# Patient Record
Sex: MALE | Race: WHITE | NOT HISPANIC OR LATINO | URBAN - METROPOLITAN AREA
[De-identification: names, ages, dates, MRNs, and addresses within clinical notes are randomized per-mention and may not be internally consistent; named-entity substitution may affect disease eponyms.]

---

## 2019-11-30 ENCOUNTER — EMERGENCY (EMERGENCY)
Facility: HOSPITAL | Age: 34
LOS: 1 days | Discharge: HOME | End: 2019-11-30
Admitting: EMERGENCY MEDICINE
Payer: COMMERCIAL

## 2019-11-30 VITALS
TEMPERATURE: 98 F | OXYGEN SATURATION: 98 % | HEIGHT: 70 IN | DIASTOLIC BLOOD PRESSURE: 89 MMHG | WEIGHT: 180.78 LBS | HEART RATE: 72 BPM | RESPIRATION RATE: 17 BRPM | SYSTOLIC BLOOD PRESSURE: 156 MMHG

## 2019-11-30 PROCEDURE — 73610 X-RAY EXAM OF ANKLE: CPT | Mod: 26,LT

## 2019-11-30 PROCEDURE — 99284 EMERGENCY DEPT VISIT MOD MDM: CPT | Mod: 25

## 2019-11-30 PROCEDURE — 27824 TREAT LOWER LEG FRACTURE: CPT

## 2019-11-30 NOTE — ED PROVIDER NOTE - OBJECTIVE STATEMENT
s/p twist and fall after tripping over a pothole as he crossed the street at 7p yesturday. states swelling over ankle started immediately. states that he had twisted the same ankle 10 days ago. had some swelling that had been slowly improving. able to weigh bear. denies focal weakness or paresthesias. denies history of fx over ankle. has hx of ligamentous injury over L knee.

## 2019-11-30 NOTE — ED PROVIDER NOTE - CARE PROVIDER_API CALL
Herminio Parks)  Orthopedics  [address] [address]  Phone: [phone]  Fax: [phone]  Follow Up Time:

## 2019-11-30 NOTE — ED PROVIDER NOTE - PATIENT PORTAL LINK FT
You can access the FollowMyHealth Patient Portal offered by [facility] by registering at the following website: [email or web address]. By joining [employer]’s FollowMyHealth portal, you will also be able to view your health information using other applications (apps) compatible with our system.

## 2019-11-30 NOTE — ED PROVIDER NOTE - CLINICAL SUMMARY MEDICAL DECISION MAKING FREE TEXT BOX
s/p twisting injury yesturday to L ankle, with twisting injury 10 days ago while playing soccer. xray with distal fibula fx. given posterior and u splint. advised follow up with PCP and ortho s/p twisting injury yesturday to L ankle, with twisting injury 10 days ago while playing soccer. xray with distal fibula fx. given posterior and u splint. dispensed crutches. advised follow up with PCP and ortho

## 2019-11-30 NOTE — ED PROVIDER NOTE - PHYSICAL EXAMINATION
L ankle: edema over lateral and medial malleolus, TTP, able to move all toes, cap refill <2 sec, DP pulses equal
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
## 2019-12-07 DIAGNOSIS — S82.392A OTHER FRACTURE OF LOWER END OF LEFT TIBIA, INITIAL ENCOUNTER FOR CLOSED FRACTURE: ICD-10-CM
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
## 2019-12-07 DIAGNOSIS — M25.572 PAIN IN LEFT ANKLE AND JOINTS OF LEFT FOOT: ICD-10-CM
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
## 2021-04-06 NOTE — ED PROVIDER NOTE - CONSTITUTIONAL NEGATIVE STATEMENT, MLM
Regular rate and rhythm, Heart sounds S1 S2 present, no murmurs, rubs or gallops
no fever and no chills.
